# Patient Record
Sex: MALE | Race: WHITE | NOT HISPANIC OR LATINO | Employment: STUDENT | URBAN - METROPOLITAN AREA
[De-identification: names, ages, dates, MRNs, and addresses within clinical notes are randomized per-mention and may not be internally consistent; named-entity substitution may affect disease eponyms.]

---

## 2023-05-16 PROBLEM — M24.20 LIGAMENT LAXITY: Status: ACTIVE | Noted: 2023-05-16

## 2023-05-16 PROBLEM — R05.9 COUGH WITH FEVER: Status: ACTIVE | Noted: 2023-05-16

## 2023-05-16 PROBLEM — M25.579 ANKLE PAIN: Status: ACTIVE | Noted: 2023-05-16

## 2023-05-16 PROBLEM — Q67.7 PECTUS CARINATUM: Status: ACTIVE | Noted: 2023-05-16

## 2023-05-16 PROBLEM — M25.569 KNEE PAIN: Status: ACTIVE | Noted: 2023-05-16

## 2023-05-16 PROBLEM — R50.9 COUGH WITH FEVER: Status: ACTIVE | Noted: 2023-05-16

## 2023-05-16 PROBLEM — M76.829 POSTERIOR TIBIAL TENDINITIS: Status: ACTIVE | Noted: 2023-05-16

## 2023-05-16 PROBLEM — J02.9 ACUTE PHARYNGITIS: Status: ACTIVE | Noted: 2023-05-16

## 2023-05-16 PROBLEM — L70.9 ACNE: Status: ACTIVE | Noted: 2023-05-16

## 2023-05-16 PROBLEM — S80.00XA CONTUSION, KNEE: Status: ACTIVE | Noted: 2023-05-16

## 2023-05-16 PROBLEM — S93.501A SPRAIN OF GREAT TOE OF RIGHT FOOT: Status: ACTIVE | Noted: 2023-05-16

## 2023-05-30 ENCOUNTER — OFFICE VISIT (OUTPATIENT)
Dept: PEDIATRICS | Facility: CLINIC | Age: 18
End: 2023-05-30
Payer: COMMERCIAL

## 2023-05-30 VITALS
BODY MASS INDEX: 23.59 KG/M2 | DIASTOLIC BLOOD PRESSURE: 67 MMHG | HEART RATE: 84 BPM | SYSTOLIC BLOOD PRESSURE: 113 MMHG | HEIGHT: 73 IN | WEIGHT: 178 LBS

## 2023-05-30 DIAGNOSIS — Z00.00 ENCOUNTER FOR ROUTINE ADULT HEALTH EXAMINATION WITHOUT ABNORMAL FINDINGS: Primary | ICD-10-CM

## 2023-05-30 PROCEDURE — 99395 PREV VISIT EST AGE 18-39: CPT | Performed by: PEDIATRICS

## 2023-05-30 NOTE — PROGRESS NOTES
Subjective     Austin is here for his annual WCC.    Parental Issues:  Questions or concerns:  either none, or only commonly asked age-specific questions    Nutrition, Elimination, and Sleep:  Nutrition:  well-balanced diet, takes foods from each food group  Elimination:  normal frequency and quality of stool  Sleep:  normal for age    Social:  Peer relations:  no concerns  Family relations:  no concerns  School performance:  no concerns  Teen questionnaire:  reviewed  Activities:  lifting, ricco, counselor       Objective   Growth chart reviewed.  General:  Well-appearing  Well-hydrated  No acute distress   Head:  Normocephalic   Eyes:  Lids and conjunctivae normal  Sclerae white  Pupils equal and reactive   ENT:  Ears:  TMs normal bilaterally  Mouth:  mucosa moist; no visible lesions  Throat:  OP moist and clear; uvula midline  Neck:  supple; no thyroid enlargement   Respiratory:  Respiratory rate:  normal  Air exchange:  normal   Adventitious breath sounds:  none  Accessory muscle use:  none   Heart:  Rate and rhythm:  regular  Murmur:  none    Abdomen:  Palpation:  soft, non-tender, non-distended, no masses  Organs:  no HSM  Bowel sounds:  normal   :  Normal external genitalia  Matthew stage:  5   MSK: Range of motion:  grossly normal in all joints  Swelling:  none  Muscle bulk and strength:  grossly normal   Skin:  Warm and well-perfused  No rashes   Lymphatic: No nodes larger than 1 cm palpated  No firm or fixed nodes palpated   Neuro:  Alert  Moves all extremities spontaneously  CN:  grossly intact  Tone:  normal      Assessment/Plan   Austin is a healthy and thriving young man.    - Anticipatory guidance regarding development, safety, nutrition, physical activity, and sleep reviewed.  - Growth:  appropriate for age  - Development:  active and social   - Social:  teenage questionnaire completed and reviewed.  Issues of smoking, vaping, substance use, sexuality, and mood discussed.    - Vaccines:  as  documented  - Return in 1 year for annual well child exam or sooner if concerns arise

## 2023-05-30 NOTE — PATIENT INSTRUCTIONS
Yearly Physical for Adults    About this topic  Most people do not want to be sick. Having a checkup each year with your doctor is one way to help you stay healthy. You may need to see your doctor more or less often. How often you need to go to the doctor depends on your age. Your family and medical history also play a role in how often you need to go to the doctor. Going to see your doctor on a routine basis can help you find problems early or even before they start. This may make it easier to treat or cure your problem.  General  Your doctor will talk about many things during your checkup. Your doctor may ask about:  Your medical and family history.  All the drugs you are taking. Be sure to include all prescription, over the counter, and herbal supplements. Tell the doctor if you have any drug allergy. Bring a list of drugs you take with you.  How you are feeling and if you are having any problems.  Risky behaviors like smoking, drinking alcohol, using illegal drugs, not wearing seatbelts, having unprotected sex, etc.  Your doctor will do a physical exam and may check your:  Height and weight  Blood pressure  Reflexes  Memory  Vision  Hearing  Your doctor may order:  Lab tests  ECG to check your heart rhythm  X-rays  Tests or treatments based on your exam  Screening tests for heart disease, diabetes, or stroke  What lifestyle changes are needed?  Your doctor may suggest you make changes to your lifestyle at this visit. The doctor may talk with you about being more active or lowering stress levels. Ask your doctor what you need to do.  What drugs may be needed?  Your doctor may order drugs or vaccines to protect you from illnesses. You may need a flu shot or COVID-19 vaccine.  What changes to diet are needed?  Talk to your doctor to see if any changes are needed to your diet.  When do I need to call the doctor?  Call your doctor if you need to learn about any test results. Together you can make a plan for more  care.  Helpful tips  Make a list of questions for your doctor before you go. This will help you remember to ask about any concerns. Write down any answers from your doctor so you can look over them after your visit.  Tell your doctor about any changes in your body or health since your last visit.  Where can I learn more?  Centers for Disease Control  https://www.cdc.gov/chronicdisease/resources/publications/factsheets/promoting-health-for-adults.htm  Centers for Disease Control  http://www.cdc.gov/family/checkup/  Centers for Disease Control  https://www.cdc.gov/vaccines/schedules/hcp/imz/adult-shell.html  U.S. Department of Health and Human Services: My [facility]

## 2023-07-21 ENCOUNTER — OFFICE VISIT (OUTPATIENT)
Dept: PEDIATRICS | Facility: CLINIC | Age: 18
End: 2023-07-21
Payer: COMMERCIAL

## 2023-07-21 VITALS — BODY MASS INDEX: 23.61 KG/M2 | WEIGHT: 176.5 LBS | TEMPERATURE: 97.3 F

## 2023-07-21 DIAGNOSIS — H61.23 BILATERAL IMPACTED CERUMEN: Primary | ICD-10-CM

## 2023-07-21 DIAGNOSIS — H60.331 ACUTE SWIMMER'S EAR OF RIGHT SIDE: ICD-10-CM

## 2023-07-21 DIAGNOSIS — H61.23 BILATERAL HEARING LOSS DUE TO CERUMEN IMPACTION: ICD-10-CM

## 2023-07-21 PROCEDURE — 99213 OFFICE O/P EST LOW 20 MIN: CPT | Performed by: PEDIATRICS

## 2023-07-21 PROCEDURE — 69210 REMOVE IMPACTED EAR WAX UNI: CPT | Performed by: PEDIATRICS

## 2023-07-21 RX ORDER — CIPROFLOXACIN AND DEXAMETHASONE 3; 1 MG/ML; MG/ML
4 SUSPENSION/ DROPS AURICULAR (OTIC) 2 TIMES DAILY
Qty: 2.8 ML | Refills: 0 | Status: SHIPPED | OUTPATIENT
Start: 2023-07-21 | End: 2023-07-21 | Stop reason: SDUPTHER

## 2023-07-21 RX ORDER — ADAPALENE AND BENZOYL PEROXIDE GEL, 0.1%/2.5% 1; 25 MG/G; MG/G
GEL TOPICAL
COMMUNITY
End: 2024-05-09 | Stop reason: WASHOUT

## 2023-07-21 RX ORDER — CIPROFLOXACIN AND DEXAMETHASONE 3; 1 MG/ML; MG/ML
4 SUSPENSION/ DROPS AURICULAR (OTIC) 2 TIMES DAILY
Qty: 2.8 ML | Refills: 0 | Status: SHIPPED | OUTPATIENT
Start: 2023-07-21 | End: 2023-07-28

## 2023-07-21 RX ORDER — TRETINOIN 0.5 MG/G
CREAM TOPICAL
COMMUNITY
Start: 2013-01-25 | End: 2024-05-09 | Stop reason: WASHOUT

## 2023-07-21 RX ORDER — CLINDAMYCIN PHOSPHATE AND BENZOYL PEROXIDE 10; 37.5 MG/G; MG/G
GEL TOPICAL
COMMUNITY
Start: 2021-09-17 | End: 2024-05-09 | Stop reason: WASHOUT

## 2023-07-21 NOTE — PROGRESS NOTES
Subjective     History was provided by the  self .    Austin is here with the following concern:    Just back from [place];  about 3 weeks ago Slade woke up feeling like his right ear was blocked. Saw an MD who did not find an ear infection. He did not say there was a lot of wax.     Objective     Temp 36.3 °C (97.3 °F)   Wt 80.1 kg (176 lb 8 oz)   BMI 23.61 kg/m²   @physicalexam@    General:  Well-appearing, well hydrated and in no acute distress     Eyes:  Lids:  normal  Conjunctivae:  normal     ENT:  Ears:  RTM: normal  obscured by large amount of cerumen           LTM:  normal  obscured by large amount of cerumen  Nose:  nares clear  Mouth:  mucosa moist; no visible lesions  Throat:  OP clear yes and moist; uvula midline  Neck:  supple     Respiratory:  Respiratory rate:  normal     Heart:     GI:      Skin:  Warm and well-perfused and no rashes apparent         Procedure Note:     Instilled pedialax to each ear and had Slade lie down for 10 min. Then I manually removed some cerumen from right canal. Other wax was deeper into canal and closer to TM. I filled 50 ml syringe with warm tap water and washed out large amounts of cerumen from left ear canal first; TM appeared normal and canal was not inflamed. Right canal I instilled viscous lidocaine before washing out large amount of cerumen. The TM appeared normal but canal was red and irritated, no active bleeding. Slade tolerated procedure well with minimum discomfort.     Assessment/Plan     Austin Anthony is well-appearing, well-hydrated, in no acute distress, and afebrile at today's visit.    His clinical presentation and examination indicates the diagnosis of cerumen impaction and right otitis externa    His treatment plan includes avoid qtips in ear canals. Cerumex or H2O2 if he starts to have wax build up. Use Ciprodex drops in right can bid for 3-5 days. Ibuprofen for pain as needed.    Supportive care measures and expected course of illness  reviewed.    Follow up promptly for worsening or prolonged illness.    Ramonita Butcher MD

## 2024-05-09 ENCOUNTER — OFFICE VISIT (OUTPATIENT)
Dept: PEDIATRICS | Facility: CLINIC | Age: 19
End: 2024-05-09
Payer: COMMERCIAL

## 2024-05-09 VITALS
WEIGHT: 170.6 LBS | SYSTOLIC BLOOD PRESSURE: 128 MMHG | DIASTOLIC BLOOD PRESSURE: 82 MMHG | HEIGHT: 72 IN | BODY MASS INDEX: 23.11 KG/M2 | HEART RATE: 79 BPM

## 2024-05-09 DIAGNOSIS — Z00.00 ENCOUNTER FOR ROUTINE ADULT HEALTH EXAMINATION WITHOUT ABNORMAL FINDINGS: Primary | ICD-10-CM

## 2024-05-09 PROCEDURE — 99395 PREV VISIT EST AGE 18-39: CPT | Performed by: PEDIATRICS

## 2024-05-09 PROCEDURE — 96127 BRIEF EMOTIONAL/BEHAV ASSMT: CPT | Performed by: PEDIATRICS

## 2024-05-09 NOTE — PROGRESS NOTES
Subjective     Austin is here for his annual WCC.    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 He will be working at [employer] this summer.     Nutrition, Elimination, and Sleep:  Nutrition:  well-balanced diet, takes foods from each food group  Elimination:  normal frequency and quality of stool  Sleep:  normal for age    Social:  Peer relations:  no concerns; in fraternity but moved out this year bc of situation in [place]  Family relations:  no concerns  School performance:  no concerns  Teen questionnaire:  reviewed  Activities:  working at [employer]; very involved in [employer] and [employer] activities      Objective   Growth chart reviewed.  General:  Well-appearing  Well-hydrated  No acute distress   Head:  Normocephalic   Eyes:  Lids and conjunctivae normal  Sclerae white  Pupils equal and reactive   ENT:  Ears:  TMs normal bilaterally  Mouth:  mucosa moist; no visible lesions  Throat:  OP moist and clear; uvula midline  Neck:  supple; no thyroid enlargement   Respiratory:  Respiratory rate:  normal  Air exchange:  normal   Adventitious breath sounds:  none  Accessory muscle use:  none   Heart:  Rate and rhythm:  regular  Murmur:  none    Abdomen:  Palpation:  soft, non-tender, non-distended, no masses  Organs:  no HSM  Bowel sounds:  normal   :  Normal external genitalia  Matthew stage:  5   MSK: Range of motion:  grossly normal in all joints  Swelling:  none  Muscle bulk and strength:  grossly normal   Skin:  Warm and well-perfused  No rashes   Lymphatic: No nodes larger than 1 cm palpated  No firm or fixed nodes palpated   Neuro:  Alert  Moves all extremities spontaneously  CN:  grossly intact  Tone:  normal      Assessment/Plan   Austin is a healthy and thriving young man    - Anticipatory guidance regarding development, safety,  nutrition, physical activity, and sleep reviewed.  - Growth:  appropriate for age  - Development:  active and social   - Social:  teenage questionnaire completed and reviewed.  Issues of smoking, vaping, substance use, sexuality, and mood discussed.    - Vaccines:  as documented; none today  - Return in 1 year for annual well child exam or sooner if concerns arise

## 2025-05-09 ENCOUNTER — APPOINTMENT (OUTPATIENT)
Dept: PEDIATRICS | Facility: CLINIC | Age: 20
End: 2025-05-09
Payer: COMMERCIAL

## 2025-05-09 VITALS
SYSTOLIC BLOOD PRESSURE: 118 MMHG | DIASTOLIC BLOOD PRESSURE: 64 MMHG | WEIGHT: 183.2 LBS | HEIGHT: 73 IN | BODY MASS INDEX: 24.28 KG/M2 | HEART RATE: 45 BPM

## 2025-05-09 DIAGNOSIS — Z00.00 ENCOUNTER FOR ROUTINE ADULT HEALTH EXAMINATION WITHOUT ABNORMAL FINDINGS: Primary | ICD-10-CM

## 2025-05-09 DIAGNOSIS — Z23 ENCOUNTER FOR IMMUNIZATION: ICD-10-CM

## 2025-05-09 PROBLEM — S80.00XA CONTUSION, KNEE: Status: RESOLVED | Noted: 2023-05-16 | Resolved: 2025-05-09

## 2025-05-09 PROBLEM — J02.9 ACUTE PHARYNGITIS: Status: RESOLVED | Noted: 2023-05-16 | Resolved: 2025-05-09

## 2025-05-09 PROBLEM — M25.571 ACUTE RIGHT ANKLE PAIN: Status: RESOLVED | Noted: 2023-02-23 | Resolved: 2025-05-09

## 2025-05-09 PROBLEM — M76.829 POSTERIOR TIBIAL TENDINITIS: Status: RESOLVED | Noted: 2023-05-16 | Resolved: 2025-05-09

## 2025-05-09 PROBLEM — S93.501A SPRAIN OF GREAT TOE OF RIGHT FOOT: Status: RESOLVED | Noted: 2023-05-16 | Resolved: 2025-05-09

## 2025-05-09 PROBLEM — M24.20 LIGAMENT LAXITY: Status: RESOLVED | Noted: 2023-05-16 | Resolved: 2025-05-09

## 2025-05-09 PROBLEM — R05.9 COUGH WITH FEVER: Status: RESOLVED | Noted: 2023-05-16 | Resolved: 2025-05-09

## 2025-05-09 PROBLEM — R50.9 COUGH WITH FEVER: Status: RESOLVED | Noted: 2023-05-16 | Resolved: 2025-05-09

## 2025-05-09 PROBLEM — M25.579 ANKLE PAIN: Status: RESOLVED | Noted: 2023-05-16 | Resolved: 2025-05-09

## 2025-05-09 PROBLEM — M25.671 DECREASED RANGE OF MOTION OF RIGHT ANKLE: Status: RESOLVED | Noted: 2023-02-23 | Resolved: 2025-05-09

## 2025-05-09 PROBLEM — M62.81 MUSCLE WEAKNESS: Status: RESOLVED | Noted: 2023-02-23 | Resolved: 2025-05-09

## 2025-05-09 PROCEDURE — 90471 IMMUNIZATION ADMIN: CPT | Performed by: PEDIATRICS

## 2025-05-09 PROCEDURE — 90715 TDAP VACCINE 7 YRS/> IM: CPT | Performed by: PEDIATRICS

## 2025-05-09 PROCEDURE — 96127 BRIEF EMOTIONAL/BEHAV ASSMT: CPT | Performed by: PEDIATRICS

## 2025-05-09 PROCEDURE — 99395 PREV VISIT EST AGE 18-39: CPT | Performed by: PEDIATRICS

## 2025-05-09 PROCEDURE — 3008F BODY MASS INDEX DOCD: CPT | Performed by: PEDIATRICS

## 2025-05-09 ASSESSMENT — PATIENT HEALTH QUESTIONNAIRE - PHQ9
7. TROUBLE CONCENTRATING ON THINGS, SUCH AS READING THE NEWSPAPER OR WATCHING TELEVISION: NOT AT ALL
6. FEELING BAD ABOUT YOURSELF - OR THAT YOU ARE A FAILURE OR HAVE LET YOURSELF OR YOUR FAMILY DOWN: NOT AT ALL
SUM OF ALL RESPONSES TO PHQ QUESTIONS 1-9: 0
3. TROUBLE FALLING OR STAYING ASLEEP: NOT AT ALL
4. FEELING TIRED OR HAVING LITTLE ENERGY: NOT AT ALL
10. IF YOU CHECKED OFF ANY PROBLEMS, HOW DIFFICULT HAVE THESE PROBLEMS MADE IT FOR YOU TO DO YOUR WORK, TAKE CARE OF THINGS AT HOME, OR GET ALONG WITH OTHER PEOPLE: NOT DIFFICULT AT ALL
8. MOVING OR SPEAKING SO SLOWLY THAT OTHER PEOPLE COULD HAVE NOTICED. OR THE OPPOSITE, BEING SO FIGETY OR RESTLESS THAT YOU HAVE BEEN MOVING AROUND A LOT MORE THAN USUAL: NOT AT ALL
1. LITTLE INTEREST OR PLEASURE IN DOING THINGS: NOT AT ALL
9. THOUGHTS THAT YOU WOULD BE BETTER OFF DEAD, OR OF HURTING YOURSELF: NOT AT ALL
9. THOUGHTS THAT YOU WOULD BE BETTER OFF DEAD, OR OF HURTING YOURSELF: NOT AT ALL
5. POOR APPETITE OR OVEREATING: NOT AT ALL
2. FEELING DOWN, DEPRESSED OR HOPELESS: NOT AT ALL
8. MOVING OR SPEAKING SO SLOWLY THAT OTHER PEOPLE COULD HAVE NOTICED. OR THE OPPOSITE - BEING SO FIDGETY OR RESTLESS THAT YOU HAVE BEEN MOVING AROUND A LOT MORE THAN USUAL: NOT AT ALL
3. TROUBLE FALLING OR STAYING ASLEEP OR SLEEPING TOO MUCH: NOT AT ALL
6. FEELING BAD ABOUT YOURSELF - OR THAT YOU ARE A FAILURE OR HAVE LET YOURSELF OR YOUR FAMILY DOWN: NOT AT ALL
2. FEELING DOWN, DEPRESSED OR HOPELESS: NOT AT ALL
4. FEELING TIRED OR HAVING LITTLE ENERGY: NOT AT ALL
10. IF YOU CHECKED OFF ANY PROBLEMS, HOW DIFFICULT HAVE THESE PROBLEMS MADE IT FOR YOU TO DO YOUR WORK, TAKE CARE OF THINGS AT HOME, OR GET ALONG WITH OTHER PEOPLE: NOT DIFFICULT AT ALL
5. POOR APPETITE OR OVEREATING: NOT AT ALL
7. TROUBLE CONCENTRATING ON THINGS, SUCH AS READING THE NEWSPAPER OR WATCHING TELEVISION: NOT AT ALL
1. LITTLE INTEREST OR PLEASURE IN DOING THINGS: NOT AT ALL
SUM OF ALL RESPONSES TO PHQ9 QUESTIONS 1 & 2: 0

## 2025-05-09 NOTE — PROGRESS NOTES
"Subjective     Austin is here for his annual WCC.    Parental Issues:  Questions or concerns:  either none, or only commonly asked age-specific questions; Slade graduated from [employer] in middle school education. 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   No health concerns today.    Nutrition, Elimination, and Sleep:  Nutrition:  well-balanced diet, takes foods from each food group  Elimination:  normal frequency and quality of stool  Sleep:  normal for age    Social:  Peer relations:  no concerns  Family relations:  no concerns  School performance:  no concerns  Teen questionnaire:  reviewed ASQ and PHQ9 low risk  Activities:  leadership, working out, lifting weights      Objective   /64   Pulse (!) 45   Ht 1.854 m (6' 1\")   Wt 83.1 kg (183 lb 3.2 oz)   BMI 24.17 kg/m²     Growth chart reviewed.  General:  Well-appearing  Well-hydrated  No acute distress   Head:  Normocephalic   Eyes:  Lids and conjunctivae normal  Sclerae white  Pupils equal and reactive   ENT:  Ears:  TMs normal bilaterally  Mouth:  mucosa moist; no visible lesions  Throat:  OP moist and clear; uvula midline  Neck:  supple; no thyroid enlargement   Respiratory:  Respiratory rate:  normal  Air exchange:  normal   Adventitious breath sounds:  none  Accessory muscle use:  none   Heart:  Rate and rhythm:  regular  Murmur:  none    Abdomen:  Palpation:  soft, non-tender, non-distended, no masses  Organs:  no HSM  Bowel sounds:  normal   :  Normal external genitalia  Matthew stage:  5   MSK: Range of motion:  grossly normal in all joints  Swelling:  none  Muscle bulk and strength:  grossly normal   Skin:  Warm and well-perfused  No rashes   Lymphatic: No nodes larger than 1 cm palpated  No firm or fixed nodes palpated   Neuro:  Alert  Moves all extremities spontaneously  CN:  grossly intact  Tone:  normal      Assessment/Plan   Austin is a healthy and thriving teenager.    - " Anticipatory guidance regarding development, safety, nutrition, physical activity, and sleep reviewed.  - Growth:  appropriate for age  - Development:  active and social   - Social:  teenage questionnaire completed and reviewed.  Issues of smoking, vaping, substance use, sexuality, and mood discussed.    - Vaccines:  as documented  Tdap Given  - Return in 1 year for annual well child exam or sooner if concerns arise